# Patient Record
Sex: MALE | Race: WHITE | NOT HISPANIC OR LATINO | URBAN - METROPOLITAN AREA
[De-identification: names, ages, dates, MRNs, and addresses within clinical notes are randomized per-mention and may not be internally consistent; named-entity substitution may affect disease eponyms.]

---

## 2018-10-16 ENCOUNTER — OFFICE VISIT (OUTPATIENT)
Dept: URBAN - METROPOLITAN AREA CLINIC 29 | Facility: CLINIC | Age: 63
End: 2018-10-16
Payer: MEDICARE

## 2018-10-16 PROCEDURE — 92133 CPTRZD OPH DX IMG PST SGM ON: CPT | Performed by: OPHTHALMOLOGY

## 2018-10-16 PROCEDURE — 92020 GONIOSCOPY: CPT | Performed by: OPHTHALMOLOGY

## 2018-10-16 PROCEDURE — 76514 ECHO EXAM OF EYE THICKNESS: CPT | Performed by: OPHTHALMOLOGY

## 2018-10-16 PROCEDURE — 99214 OFFICE O/P EST MOD 30 MIN: CPT | Performed by: OPHTHALMOLOGY

## 2018-10-16 ASSESSMENT — INTRAOCULAR PRESSURE
OS: 13
OD: 15

## 2018-10-16 NOTE — IMPRESSION/PLAN
Impression: Open angle with borderline findings, low risk, bilateral: H40.013. OU.
thin CCT ou -
IOP doing well ou - ONH large CD ratio ou - Plan: Discussed diagnosis, explained and understood by patient. Discussed IOP/ONH/Glaucoma management and risks. OCT ordered, performed and reviewed with patient today. No glaucoma treatment recommended OU. Continue to monitor condition and symptoms. Reconsult PRN with Dr. Sen Pulido.

## 2020-01-31 ENCOUNTER — OFFICE VISIT (OUTPATIENT)
Dept: URBAN - METROPOLITAN AREA CLINIC 29 | Facility: CLINIC | Age: 65
End: 2020-01-31
Payer: MEDICARE

## 2020-01-31 PROCEDURE — 92004 COMPRE OPH EXAM NEW PT 1/>: CPT | Performed by: OPTOMETRIST

## 2020-01-31 ASSESSMENT — INTRAOCULAR PRESSURE
OD: 15
OS: 15

## 2020-01-31 NOTE — IMPRESSION/PLAN
Impression: Open angle with borderline findings, low risk, bilateral: H40.013. OU.
thin CCT ou -
IOP doing well ou - ONH large CD ratio ou - Plan: Discussed diagnosis, explained and understood by patient. Discussed IOP/ONH/Glaucoma management and risks. OCT ordered, performed and reviewed with patient today. No glaucoma treatment recommended OU. Continue to monitor condition and symptoms.

## 2021-01-21 ENCOUNTER — OFFICE VISIT (OUTPATIENT)
Dept: URBAN - METROPOLITAN AREA CLINIC 29 | Facility: CLINIC | Age: 66
End: 2021-01-21
Payer: MEDICARE

## 2021-01-21 DIAGNOSIS — H40.013 OPEN ANGLE WITH BORDERLINE FINDINGS, LOW RISK, BILATERAL: Primary | ICD-10-CM

## 2021-01-21 PROCEDURE — 92014 COMPRE OPH EXAM EST PT 1/>: CPT | Performed by: OPTOMETRIST

## 2021-01-21 PROCEDURE — 92133 CPTRZD OPH DX IMG PST SGM ON: CPT | Performed by: OPTOMETRIST

## 2021-01-21 ASSESSMENT — INTRAOCULAR PRESSURE
OS: 13
OD: 16

## 2021-01-21 NOTE — IMPRESSION/PLAN
Impression: Open angle with borderline findings, low risk, bilateral: H40.013. OU.
thin CCT ou -
IOP doing well ou - ONH large CD ratio ou -

OCT - 91/21??(91/84) Plan: Discussed diagnosis, explained and understood by patient. Discussed IOP/ONH/Glaucoma management and risks. OCT ordered, performed and reviewed with patient today. No glaucoma treatment recommended OU. Continue to monitor condition and symptoms.

## 2022-01-20 ENCOUNTER — OFFICE VISIT (OUTPATIENT)
Dept: URBAN - METROPOLITAN AREA CLINIC 28 | Facility: CLINIC | Age: 67
End: 2022-01-20
Payer: MEDICARE

## 2022-01-20 DIAGNOSIS — H25.13 AGE-RELATED NUCLEAR CATARACT, BILATERAL: ICD-10-CM

## 2022-01-20 PROCEDURE — 92133 CPTRZD OPH DX IMG PST SGM ON: CPT | Performed by: OPTOMETRIST

## 2022-01-20 PROCEDURE — 92004 COMPRE OPH EXAM NEW PT 1/>: CPT | Performed by: OPTOMETRIST

## 2022-01-20 PROCEDURE — 92083 EXTENDED VISUAL FIELD XM: CPT | Performed by: OPTOMETRIST

## 2022-01-20 ASSESSMENT — INTRAOCULAR PRESSURE
OS: 12
OD: 13

## 2022-01-20 NOTE — IMPRESSION/PLAN
Impression: Open angle with borderline findings, low risk, bilateral: H40.013. Plan: Educated on exam findings and condition. Based on exam findings and testing, no signs of glaucomatous damage. Careful observation was discussed and is strongly recommended to prevent possible future vision loss. Will continue to monitor intraocular pressure and testing in clinic at regular intervals.

## 2023-01-20 ENCOUNTER — OFFICE VISIT (OUTPATIENT)
Dept: URBAN - METROPOLITAN AREA CLINIC 28 | Facility: CLINIC | Age: 68
End: 2023-01-20
Payer: MEDICARE

## 2023-01-20 DIAGNOSIS — H25.13 AGE-RELATED NUCLEAR CATARACT, BILATERAL: ICD-10-CM

## 2023-01-20 DIAGNOSIS — H40.013 OPEN ANGLE WITH BORDERLINE FINDINGS, LOW RISK, BILATERAL: Primary | ICD-10-CM

## 2023-01-20 PROCEDURE — 92133 CPTRZD OPH DX IMG PST SGM ON: CPT | Performed by: OPTOMETRIST

## 2023-01-20 PROCEDURE — 92014 COMPRE OPH EXAM EST PT 1/>: CPT | Performed by: OPTOMETRIST

## 2023-01-20 PROCEDURE — 92083 EXTENDED VISUAL FIELD XM: CPT | Performed by: OPTOMETRIST

## 2023-01-20 ASSESSMENT — INTRAOCULAR PRESSURE
OD: 15
OS: 12

## 2023-01-20 NOTE — IMPRESSION/PLAN
Impression: Open angle with borderline findings, low risk, bilateral: H40.013. Plan: Educated on exam findings and condition. Based on exam findings and testing, no signs of glaucomatous damage. Careful observation was discussed and is strongly recommended to prevent possible future vision loss. Will continue to monitor intraocular pressure and testing in clinic at regular intervals. Myopic degenerative changes OS>>OD.

## 2024-01-22 ENCOUNTER — OFFICE VISIT (OUTPATIENT)
Dept: URBAN - METROPOLITAN AREA CLINIC 28 | Facility: CLINIC | Age: 69
End: 2024-01-22
Payer: MEDICARE

## 2024-01-22 DIAGNOSIS — H25.13 AGE-RELATED NUCLEAR CATARACT, BILATERAL: ICD-10-CM

## 2024-01-22 DIAGNOSIS — H40.013 OPEN ANGLE WITH BORDERLINE FINDINGS, LOW RISK, BILATERAL: Primary | ICD-10-CM

## 2024-01-22 PROCEDURE — 92083 EXTENDED VISUAL FIELD XM: CPT | Performed by: OPTOMETRIST

## 2024-01-22 PROCEDURE — 92133 CPTRZD OPH DX IMG PST SGM ON: CPT | Performed by: OPTOMETRIST

## 2024-01-22 PROCEDURE — 92014 COMPRE OPH EXAM EST PT 1/>: CPT | Performed by: OPTOMETRIST

## 2024-01-22 ASSESSMENT — INTRAOCULAR PRESSURE
OD: 15
OS: 13
OS: 12

## 2024-01-22 ASSESSMENT — KERATOMETRY
OS: 36.75
OD: 40.00

## 2025-01-23 ENCOUNTER — OFFICE VISIT (OUTPATIENT)
Dept: URBAN - METROPOLITAN AREA CLINIC 28 | Facility: CLINIC | Age: 70
End: 2025-01-23
Payer: MEDICARE

## 2025-01-23 DIAGNOSIS — H40.013 OPEN ANGLE WITH BORDERLINE FINDINGS, LOW RISK, BILATERAL: Primary | ICD-10-CM

## 2025-01-23 DIAGNOSIS — H43.811 VITREOUS DETACHMENT OF RIGHT EYE: ICD-10-CM

## 2025-01-23 DIAGNOSIS — H25.13 AGE-RELATED NUCLEAR CATARACT, BILATERAL: ICD-10-CM

## 2025-01-23 PROCEDURE — 92133 CPTRZD OPH DX IMG PST SGM ON: CPT | Performed by: OPTOMETRIST

## 2025-01-23 PROCEDURE — 92083 EXTENDED VISUAL FIELD XM: CPT | Performed by: OPTOMETRIST

## 2025-01-23 PROCEDURE — 92014 COMPRE OPH EXAM EST PT 1/>: CPT | Performed by: OPTOMETRIST

## 2025-01-23 ASSESSMENT — KERATOMETRY
OD: 39.88
OS: 36.88

## 2025-01-23 ASSESSMENT — INTRAOCULAR PRESSURE
OS: 11
OD: 11